# Patient Record
Sex: FEMALE | Race: BLACK OR AFRICAN AMERICAN | NOT HISPANIC OR LATINO | URBAN - METROPOLITAN AREA
[De-identification: names, ages, dates, MRNs, and addresses within clinical notes are randomized per-mention and may not be internally consistent; named-entity substitution may affect disease eponyms.]

---
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
## 2025-03-19 NOTE — ED PEDIATRIC NURSE NOTE - PAIN RATING/NUMBER SCALE (0-10): REST
Continue Regimen: Opzelura 1.5 % topical cream, Apply to the affected areas of the body x 6 weeks, then take a break for two weeks, then restart.\\n\\nEpiCeram topical emulsion, extended release, Apply to the affected areas twice a day for moisture and to maintain the skin.
Detail Level: Zone
Render In Strict Bullet Format?: No
4